# Patient Record
Sex: FEMALE | URBAN - NONMETROPOLITAN AREA
[De-identification: names, ages, dates, MRNs, and addresses within clinical notes are randomized per-mention and may not be internally consistent; named-entity substitution may affect disease eponyms.]

---

## 2021-06-15 ENCOUNTER — APPOINTMENT (RX ONLY)
Dept: URBAN - NONMETROPOLITAN AREA CLINIC 29 | Facility: CLINIC | Age: 56
Setting detail: DERMATOLOGY
End: 2021-06-15

## 2021-06-15 DIAGNOSIS — D22 MELANOCYTIC NEVI: ICD-10-CM

## 2021-06-15 DIAGNOSIS — Z80.8 FAMILY HISTORY OF MALIGNANT NEOPLASM OF OTHER ORGANS OR SYSTEMS: ICD-10-CM

## 2021-06-15 DIAGNOSIS — L82.1 OTHER SEBORRHEIC KERATOSIS: ICD-10-CM

## 2021-06-15 DIAGNOSIS — L82.0 INFLAMED SEBORRHEIC KERATOSIS: ICD-10-CM

## 2021-06-15 DIAGNOSIS — Z12.83 ENCOUNTER FOR SCREENING FOR MALIGNANT NEOPLASM OF SKIN: ICD-10-CM

## 2021-06-15 PROBLEM — D22.5 MELANOCYTIC NEVI OF TRUNK: Status: ACTIVE | Noted: 2021-06-15

## 2021-06-15 PROCEDURE — 17110 DESTRUCTION B9 LES UP TO 14: CPT

## 2021-06-15 PROCEDURE — ? COUNSELING

## 2021-06-15 PROCEDURE — ? LIQUID NITROGEN

## 2021-06-15 PROCEDURE — 99203 OFFICE O/P NEW LOW 30 MIN: CPT | Mod: 25

## 2021-06-15 ASSESSMENT — LOCATION SIMPLE DESCRIPTION DERM
LOCATION SIMPLE: ABDOMEN
LOCATION SIMPLE: RIGHT FOREARM
LOCATION SIMPLE: LEFT UPPER BACK
LOCATION SIMPLE: LEFT ANTERIOR NECK
LOCATION SIMPLE: RIGHT ANTERIOR NECK
LOCATION SIMPLE: LEFT CLAVICULAR SKIN
LOCATION SIMPLE: LEFT BREAST
LOCATION SIMPLE: RIGHT LOWER BACK
LOCATION SIMPLE: LEFT FOREARM

## 2021-06-15 ASSESSMENT — LOCATION DETAILED DESCRIPTION DERM
LOCATION DETAILED: LEFT CLAVICULAR SKIN
LOCATION DETAILED: LEFT MEDIAL UPPER BACK
LOCATION DETAILED: RIGHT INFERIOR LATERAL NECK
LOCATION DETAILED: RIGHT INFERIOR ANTERIOR NECK
LOCATION DETAILED: EPIGASTRIC SKIN
LOCATION DETAILED: LEFT RIB CAGE
LOCATION DETAILED: RIGHT PROXIMAL RADIAL DORSAL FOREARM
LOCATION DETAILED: LEFT CLAVICULAR NECK
LOCATION DETAILED: RIGHT SUPERIOR MEDIAL MIDBACK
LOCATION DETAILED: LEFT INFRAMAMMARY CREASE (INNER QUADRANT)
LOCATION DETAILED: RIGHT RIB CAGE
LOCATION DETAILED: LEFT PROXIMAL DORSAL FOREARM
LOCATION DETAILED: SUBXIPHOID
LOCATION DETAILED: LEFT INFERIOR LATERAL NECK

## 2021-06-15 ASSESSMENT — LOCATION ZONE DERM
LOCATION ZONE: TRUNK
LOCATION ZONE: NECK
LOCATION ZONE: ARM

## 2021-06-15 NOTE — PROCEDURE: LIQUID NITROGEN
Include Z78.9 (Other Specified Conditions Influencing Health Status) As An Associated Diagnosis?: No
Show Aperture Variable?: Yes
Medical Necessity Clause: This procedure was medically necessary because the lesions that were treated were:
Post-Care Instructions: I reviewed with the patient in detail post-care instructions. Patient is to wear sunprotection, and avoid picking at any of the treated lesions. Pt may apply Vaseline to crusted or scabbing areas.
Detail Level: Simple
Medical Necessity Information: It is in your best interest to select a reason for this procedure from the list below. All of these items fulfill various CMS LCD requirements except the new and changing color options.
Number Of Freeze-Thaw Cycles: 1 freeze-thaw cycle
Consent: The patient's consent was obtained including but not limited to risks of crusting, scabbing, blistering, scarring, darker or lighter pigmentary change, recurrence, incomplete removal and infection.